# Patient Record
Sex: MALE | Race: WHITE | Employment: OTHER | URBAN - METROPOLITAN AREA
[De-identification: names, ages, dates, MRNs, and addresses within clinical notes are randomized per-mention and may not be internally consistent; named-entity substitution may affect disease eponyms.]

---

## 2023-01-04 ENCOUNTER — TELEPHONE (OUTPATIENT)
Dept: CARDIOLOGY CLINIC | Age: 83
End: 2023-01-04

## 2023-01-04 ENCOUNTER — OFFICE VISIT (OUTPATIENT)
Dept: CARDIOLOGY CLINIC | Age: 83
End: 2023-01-04

## 2023-01-04 VITALS
HEART RATE: 79 BPM | DIASTOLIC BLOOD PRESSURE: 72 MMHG | BODY MASS INDEX: 32.19 KG/M2 | SYSTOLIC BLOOD PRESSURE: 126 MMHG | WEIGHT: 212.4 LBS | HEIGHT: 68 IN | OXYGEN SATURATION: 96 %

## 2023-01-04 DIAGNOSIS — I48.92 ATRIAL FLUTTER, UNSPECIFIED TYPE (HCC): ICD-10-CM

## 2023-01-04 DIAGNOSIS — I49.9 IRREGULAR HEART RATE: Primary | ICD-10-CM

## 2023-01-04 NOTE — TELEPHONE ENCOUNTER
Patient was seen in office 1/4/2023. Atrial Flutter Ablation discussed and agreed upon by Ge Cabral and patient. Medications NOT discussed. Patient will NOT need covid testing prior. Thank you!         Meds to hold:  Xarelto 24 hours prior to procedure   Vitamin B Complex AM of procedure  Citrical+D AM of procedure  Multivitamin AM of procedure    Can take other medications AM of procedure with sip of water

## 2023-01-04 NOTE — PROGRESS NOTES
[facility]   Cardiac Consultation    Date: 1/4/23  Patient Name: Mya Archibald  YOB: 1940    Primary Care Physician: Cynthia Mejia    CHIEF COMPLAINT:   Chief Complaint   Patient presents with    3 Month Follow-Up    Other     Atrial flutter       HPI:  Mya Archibald is a 79 y.o. male  he had an audiology appointment in July and had complaints of dizziness. ECG at that time showed atrial flutter and he was started on Xarelto. Echocardiography from 8/11/2022 demonstrates LVEF of 45 to 50% with no significant valvular abnormalities. A cardiac event monitor worn for 24 hours in 08/2022 demonstrated predominately AF (87% burden) with an average HR of 83 BPM (710-125), PAC burden 4%. Prior to this, it had been 12 years since he had had an ECG. He reported that he recently had numbness in his left arm and leg, head and face included. He reported feeling occasional flutters. He reported that he stayed active with working on cars and working in his barn. He reported that he tolerates these activities well. He reported that he fatigues easily. Today, 01/04/2023, he reports feeling overall well. He reports continuing to feel fatigued on occasion with palpitations. Patient denies current edema, chest pain, sob, dizziness or syncope. Patient is taking all cardiac medications as prescribed and tolerates them well. Past Medical History:   has no past medical history on file. Surgical History:   has no past surgical history on file. Social History:   reports that he has never smoked. He has never used smokeless tobacco. He reports that he does not currently use alcohol. He reports that he does not use drugs. Family History:  family history is not on file.      Home Medications:  Outpatient Encounter Medications as of 1/4/2023   Medication Sig Dispense Refill    terazosin (HYTRIN) 10 MG capsule Take 10 mg by mouth daily      XARELTO 20 MG TABS tablet Take 20 mg by mouth daily      rosuvastatin (CRESTOR) 10 MG tablet Take 10 mg by mouth daily      fluticasone-salmeterol (ADVAIR) 100-50 MCG/ACT AEPB diskus inhaler Inhale 1 puff into the lungs every 12 hours      Multiple Vitamins-Minerals (THERAPEUTIC MULTIVITAMIN-MINERALS) tablet Take 1 tablet by mouth daily      calcium citrate-vitamin D (CITRICAL + D) 315-250 MG-UNIT TABS per tablet Take 1 tablet by mouth daily (with breakfast)      aspirin 81 MG chewable tablet Take 81 mg by mouth daily      B Complex Vitamins (VITAMIN B COMPLEX PO) Take by mouth daily       No facility-administered encounter medications on file as of 1/4/2023. Data:  ECG 1/4/23: Personally reviewed. All current cardiac medications reviewed and adjusted accordingly  1/4/23    Allergies:  Patient has no known allergies. Review of Systems   Constitutional: Negative. HENT: Negative. Eyes: Negative. Respiratory: Negative. Cardiovascular: Negative. Gastrointestinal: Negative. Genitourinary: Negative. Musculoskeletal: Negative. Skin: Negative. Neurological: Negative. Hematological: Negative. Psychiatric/Behavioral: Negative. /72   Pulse 79   Ht 5' 8\" (1.727 m)   Wt 212 lb 6.4 oz (96.3 kg)   SpO2 96%   BMI 32.30 kg/m²     DATA:  ECG 1/4/23: Personally reviewed. ECHO 83/28/4298 ([address] [place] [address]) :        STRESS 56/04/7181 ([place]) :       Objective:  Physical Exam   Constitutional: He is oriented to person, place, and time. He appears well-developed and well-nourished. HENT:   Head: Normocephalic and atraumatic. Eyes: Pupils are equal, round, and reactive to light. Neck: Normal range of motion. Cardiovascular: Normal rate, irregular rhythm and normal heart sounds. Pulmonary/Chest: Effort normal and breath sounds normal.   Abdominal: Soft. No tenderness. Musculoskeletal: Normal range of motion. He exhibits no edema. Neurological: He is alert and oriented to person, place, and time.    Skin: Skin is warm and dry. Psychiatric: He has a normal mood and affect. Assessment:  Atrial flutter-this was identified on an evaluation for dizziness. He has modest left ventricular dysfunction by echocardiography. ECG in the office 10/24/2022 demonstrated typical appearing tricuspid annular flutter with adequately controlled heart rate and right bundle branch block. He remains in atrial flutter per ECG in office today. Since he has sustained an typical appearing atrial flutter, catheter ablation is the most reasonable treatment option. This offers a greater than 90% chance of permanent cure with a 1 to 2% likelihood of a procedure related complication. Recommend proceeding with RFCA for AFL as discussed. RBBB- per ECG      Plan:  Discussed risks and benefits of RFCA for atrial flutter   -patient would like to proceed with this option(literature provided)  2. Continue taking cardiac medications as prescribed  3. Follow up after procedure     QUALITY MEASURES  1. Tobacco Cessation Counseling: NA  2. Retake of BP if >140/90:   Yes  3. Documentation to PCP/referring for new patient:  Sent to PCP at close of office visit  4. CAD patient on anti-platelet: NA  5. CAD patient on STATIN therapy:  Yes  6. Patient with CHF and aFib on anticoagulation: Yes      Óscar Main M.D.     Yuli Galicia RN, am scribing for and in the presence of Dr. Óscar Main. 01/04/23 10:58 AM   Debora Amor RN     I, Dr. Óscar Main, personally performed the services described in this documentation as scribed by Debora Amor RN  in my presence, and it is both accurate and complete.

## 2023-01-04 NOTE — PATIENT INSTRUCTIONS
Plan:  Discussed risks and benefits of RFCA for atrial flutter   -patient would like to proceed with this option(literature provided)  2. Continue taking cardiac medications as prescribed  3.  Follow up after procedure

## 2023-01-06 NOTE — TELEPHONE ENCOUNTER
Spoke with patient. Patient is scheduled with Dr. Gina Marcos for Aflutter Ablation with Carto on 22 at 12:30pm [facility], arrival time of 11am to the Cath Lab. Please have patient arrive to the main entrance of [facility] and check in with the registration desk. Remind patient to be NPO after midnight (8 hours prior). Do not apply lotions/creams on skin the day of procedure. Pt's  updated from [birth year].

## 2023-01-18 ENCOUNTER — HOSPITAL ENCOUNTER (INPATIENT)
Dept: CARDIAC CATH/INVASIVE PROCEDURES | Age: 83
LOS: 1 days | Discharge: HOME | DRG: 274 | End: 2023-01-19
Attending: INTERNAL MEDICINE | Admitting: INTERNAL MEDICINE
Payer: OTHER GOVERNMENT

## 2023-01-18 PROBLEM — Z98.890 STATUS POST ABLATION OF ATRIAL FLUTTER: Status: ACTIVE | Noted: 2023-01-18

## 2023-01-18 PROBLEM — Z86.79 STATUS POST ABLATION OF ATRIAL FLUTTER: Status: ACTIVE | Noted: 2023-01-18

## 2023-01-18 LAB
ANION GAP SERPL CALCULATED.3IONS-SCNC: 10 MMOL/L (ref 3–16)
BUN BLDV-MCNC: 17 MG/DL (ref 7–20)
CALCIUM SERPL-MCNC: 9.2 MG/DL (ref 8.3–10.6)
CHLORIDE BLD-SCNC: 105 MMOL/L (ref 99–110)
CHOLESTEROL, TOTAL: 118 MG/DL (ref 0–199)
CO2: 27 MMOL/L (ref 21–32)
CREAT SERPL-MCNC: 0.9 MG/DL (ref 0.8–1.3)
EKG ATRIAL RATE: 55 BPM
EKG ATRIAL RATE: 75 BPM
EKG DIAGNOSIS: NORMAL
EKG DIAGNOSIS: NORMAL
EKG P AXIS: 68 DEGREES
EKG P-R INTERVAL: 208 MS
EKG Q-T INTERVAL: 418 MS
EKG Q-T INTERVAL: 446 MS
EKG QRS DURATION: 134 MS
EKG QRS DURATION: 136 MS
EKG QTC CALCULATION (BAZETT): 426 MS
EKG QTC CALCULATION (BAZETT): 470 MS
EKG R AXIS: -12 DEGREES
EKG R AXIS: 25 DEGREES
EKG T AXIS: 19 DEGREES
EKG T AXIS: 28 DEGREES
EKG VENTRICULAR RATE: 55 BPM
EKG VENTRICULAR RATE: 76 BPM
GFR SERPL CREATININE-BSD FRML MDRD: >60 ML/MIN/{1.73_M2}
GLUCOSE BLD-MCNC: 100 MG/DL (ref 70–99)
HCT VFR BLD CALC: 44.6 % (ref 40.5–52.5)
HDLC SERPL-MCNC: 46 MG/DL (ref 40–60)
HEMOGLOBIN: 14.7 G/DL (ref 13.5–17.5)
INR BLD: 1.11 (ref 0.87–1.14)
LDL CHOLESTEROL CALCULATED: 49 MG/DL
MCH RBC QN AUTO: 29.3 PG (ref 26–34)
MCHC RBC AUTO-ENTMCNC: 32.9 G/DL (ref 31–36)
MCV RBC AUTO: 89.2 FL (ref 80–100)
PDW BLD-RTO: 13.2 % (ref 12.4–15.4)
PLATELET # BLD: 188 K/UL (ref 135–450)
PLATELET SLIDE REVIEW: ADEQUATE
PMV BLD AUTO: 9.2 FL (ref 5–10.5)
POTASSIUM REFLEX MAGNESIUM: 4 MMOL/L (ref 3.5–5.1)
PROTHROMBIN TIME: 14.2 SEC (ref 11.7–14.5)
RBC # BLD: 5 M/UL (ref 4.2–5.9)
SLIDE REVIEW: NORMAL
SODIUM BLD-SCNC: 142 MMOL/L (ref 136–145)
SPECIMEN STATUS: NORMAL
TRIGL SERPL-MCNC: 114 MG/DL (ref 0–150)
VLDLC SERPL CALC-MCNC: 23 MG/DL
WBC # BLD: 6.9 K/UL (ref 4–11)

## 2023-01-18 PROCEDURE — C1730 CATH, EP, 19 OR FEW ELECT: HCPCS

## 2023-01-18 PROCEDURE — 02K83ZZ MAP CONDUCTION MECHANISM, PERCUTANEOUS APPROACH: ICD-10-PCS | Performed by: INTERNAL MEDICINE

## 2023-01-18 PROCEDURE — 2500000003 HC RX 250 WO HCPCS

## 2023-01-18 PROCEDURE — 2709999900 HC NON-CHARGEABLE SUPPLY

## 2023-01-18 PROCEDURE — C1894 INTRO/SHEATH, NON-LASER: HCPCS

## 2023-01-18 PROCEDURE — 02583ZZ DESTRUCTION OF CONDUCTION MECHANISM, PERCUTANEOUS APPROACH: ICD-10-PCS | Performed by: INTERNAL MEDICINE

## 2023-01-18 PROCEDURE — 86850 RBC ANTIBODY SCREEN: CPT

## 2023-01-18 PROCEDURE — 6360000002 HC RX W HCPCS

## 2023-01-18 PROCEDURE — 93005 ELECTROCARDIOGRAM TRACING: CPT | Performed by: INTERNAL MEDICINE

## 2023-01-18 PROCEDURE — 2060000000 HC ICU INTERMEDIATE R&B

## 2023-01-18 PROCEDURE — 2580000003 HC RX 258: Performed by: INTERNAL MEDICINE

## 2023-01-18 PROCEDURE — 6370000000 HC RX 637 (ALT 250 FOR IP): Performed by: INTERNAL MEDICINE

## 2023-01-18 PROCEDURE — 93653 COMPRE EP EVAL TX SVT: CPT

## 2023-01-18 PROCEDURE — C1732 CATH, EP, DIAG/ABL, 3D/VECT: HCPCS

## 2023-01-18 PROCEDURE — 86901 BLOOD TYPING SEROLOGIC RH(D): CPT

## 2023-01-18 PROCEDURE — 85610 PROTHROMBIN TIME: CPT

## 2023-01-18 PROCEDURE — 85027 COMPLETE CBC AUTOMATED: CPT

## 2023-01-18 PROCEDURE — 80061 LIPID PANEL: CPT

## 2023-01-18 PROCEDURE — 93010 ELECTROCARDIOGRAM REPORT: CPT | Performed by: INTERNAL MEDICINE

## 2023-01-18 PROCEDURE — 4A023FZ MEASUREMENT OF CARDIAC RHYTHM, PERCUTANEOUS APPROACH: ICD-10-PCS | Performed by: INTERNAL MEDICINE

## 2023-01-18 PROCEDURE — 86900 BLOOD TYPING SEROLOGIC ABO: CPT

## 2023-01-18 PROCEDURE — 4A0234Z MEASUREMENT OF CARDIAC ELECTRICAL ACTIVITY, PERCUTANEOUS APPROACH: ICD-10-PCS | Performed by: INTERNAL MEDICINE

## 2023-01-18 PROCEDURE — 80048 BASIC METABOLIC PNL TOTAL CA: CPT

## 2023-01-18 RX ORDER — ACETAMINOPHEN 325 MG/1
650 TABLET ORAL EVERY 4 HOURS PRN
Status: DISCONTINUED | OUTPATIENT
Start: 2023-01-18 | End: 2023-01-19 | Stop reason: HOSPADM

## 2023-01-18 RX ORDER — FENTANYL CITRATE 50 UG/ML
INJECTION, SOLUTION INTRAMUSCULAR; INTRAVENOUS
Status: COMPLETED | OUTPATIENT
Start: 2023-01-18 | End: 2023-01-18

## 2023-01-18 RX ORDER — ONDANSETRON 2 MG/ML
4 INJECTION INTRAMUSCULAR; INTRAVENOUS EVERY 6 HOURS PRN
Status: DISCONTINUED | OUTPATIENT
Start: 2023-01-18 | End: 2023-01-19 | Stop reason: HOSPADM

## 2023-01-18 RX ORDER — M-VIT,TX,IRON,MINS/CALC/FOLIC 27MG-0.4MG
1 TABLET ORAL DAILY
Status: DISCONTINUED | OUTPATIENT
Start: 2023-01-18 | End: 2023-01-19 | Stop reason: HOSPADM

## 2023-01-18 RX ORDER — DOXAZOSIN 2 MG/1
4 TABLET ORAL DAILY
Status: DISCONTINUED | OUTPATIENT
Start: 2023-01-18 | End: 2023-01-19 | Stop reason: HOSPADM

## 2023-01-18 RX ORDER — SODIUM CHLORIDE 0.9 % (FLUSH) 0.9 %
5-40 SYRINGE (ML) INJECTION EVERY 12 HOURS SCHEDULED
Status: DISCONTINUED | OUTPATIENT
Start: 2023-01-18 | End: 2023-01-19 | Stop reason: HOSPADM

## 2023-01-18 RX ORDER — SODIUM CHLORIDE 0.9 % (FLUSH) 0.9 %
5-40 SYRINGE (ML) INJECTION PRN
Status: DISCONTINUED | OUTPATIENT
Start: 2023-01-18 | End: 2023-01-19 | Stop reason: HOSPADM

## 2023-01-18 RX ORDER — CALCIUM CARBONATE-CHOLECALCIFEROL TAB 250 MG-125 UNIT 250-125 MG-UNIT
1 TAB ORAL
Status: DISCONTINUED | OUTPATIENT
Start: 2023-01-19 | End: 2023-01-19 | Stop reason: HOSPADM

## 2023-01-18 RX ORDER — SODIUM CHLORIDE 9 MG/ML
INJECTION, SOLUTION INTRAVENOUS PRN
Status: DISCONTINUED | OUTPATIENT
Start: 2023-01-18 | End: 2023-01-19 | Stop reason: HOSPADM

## 2023-01-18 RX ORDER — ASPIRIN 81 MG/1
81 TABLET, CHEWABLE ORAL DAILY
Status: DISCONTINUED | OUTPATIENT
Start: 2023-01-18 | End: 2023-01-19 | Stop reason: HOSPADM

## 2023-01-18 RX ORDER — MIDAZOLAM HYDROCHLORIDE 1 MG/ML
INJECTION INTRAMUSCULAR; INTRAVENOUS
Status: COMPLETED | OUTPATIENT
Start: 2023-01-18 | End: 2023-01-18

## 2023-01-18 RX ORDER — ROSUVASTATIN CALCIUM 10 MG/1
10 TABLET, COATED ORAL DAILY
Status: DISCONTINUED | OUTPATIENT
Start: 2023-01-18 | End: 2023-01-19 | Stop reason: HOSPADM

## 2023-01-18 RX ORDER — LORAZEPAM 0.5 MG/1
0.5 TABLET ORAL
Status: ACTIVE | OUTPATIENT
Start: 2023-01-18 | End: 2023-01-19

## 2023-01-18 RX ORDER — ASPIRIN 325 MG
325 TABLET ORAL ONCE
Status: DISCONTINUED | OUTPATIENT
Start: 2023-01-18 | End: 2023-01-19 | Stop reason: HOSPADM

## 2023-01-18 RX ADMIN — FENTANYL CITRATE 50 MCG: 50 INJECTION, SOLUTION INTRAMUSCULAR; INTRAVENOUS at 12:41

## 2023-01-18 RX ADMIN — ASPIRIN 81 MG: 81 TABLET, CHEWABLE ORAL at 18:14

## 2023-01-18 RX ADMIN — ROSUVASTATIN CALCIUM 10 MG: 10 TABLET, FILM COATED ORAL at 18:11

## 2023-01-18 RX ADMIN — Medication 10 ML: at 19:36

## 2023-01-18 RX ADMIN — MIDAZOLAM HYDROCHLORIDE 1 MG: 1 INJECTION INTRAMUSCULAR; INTRAVENOUS at 14:20

## 2023-01-18 RX ADMIN — MIDAZOLAM HYDROCHLORIDE 1 MG: 1 INJECTION INTRAMUSCULAR; INTRAVENOUS at 12:41

## 2023-01-18 RX ADMIN — FENTANYL CITRATE 25 MCG: 50 INJECTION, SOLUTION INTRAMUSCULAR; INTRAVENOUS at 13:33

## 2023-01-18 RX ADMIN — Medication 1 TABLET: at 18:11

## 2023-01-18 RX ADMIN — FENTANYL CITRATE 50 MCG: 50 INJECTION, SOLUTION INTRAMUSCULAR; INTRAVENOUS at 14:21

## 2023-01-18 RX ADMIN — MIDAZOLAM HYDROCHLORIDE 0.5 MG: 1 INJECTION INTRAMUSCULAR; INTRAVENOUS at 13:33

## 2023-01-18 RX ADMIN — DOXAZOSIN 4 MG: 2 TABLET ORAL at 18:12

## 2023-01-18 ASSESSMENT — PAIN SCALES - GENERAL: PAINLEVEL_OUTOF10: 0

## 2023-01-18 ASSESSMENT — LIFESTYLE VARIABLES
HOW MANY STANDARD DRINKS CONTAINING ALCOHOL DO YOU HAVE ON A TYPICAL DAY: PATIENT DOES NOT DRINK
HOW OFTEN DO YOU HAVE A DRINK CONTAINING ALCOHOL: NEVER

## 2023-01-18 NOTE — SEDATION DOCUMENTATION
Sedation start time: 1239  Sedation stop time: 1424  Mallampati: 3  Pre and post Niki score: 10/10  Medication given: IV Versed 2.5 mg, IV fentanyl 125 mcg  Pre-Procedure Assessment / Plan:  ASA: Class 2 - A normal healthy patient with mild systemic disease  Level of Sedation Plan: Moderate sedation  Post Procedure plan: Return to same level of care     An independent trained observer pushed medications at my direction. We monitored the patient's level of consciousness and vital signs/physiologic status throughout the procedure duration (see start and start times above).

## 2023-01-18 NOTE — H&P
Patient Name: Kenny Guallpa  YOB: 1940     Primary Care Physician: Alee Mir     CHIEF COMPLAINT:        Chief Complaint   Patient presents with    3 Month Follow-Up    Other       Atrial flutter         HPI:  Kenny Guallpa is a 79 y.o. male  he had an audiology appointment in July and had complaints of dizziness. ECG at that time showed atrial flutter and he was started on Xarelto. Echocardiography from 8/11/2022 demonstrates LVEF of 45 to 50% with no significant valvular abnormalities. A cardiac event monitor worn for 24 hours in 08/2022 demonstrated predominately AF (87% burden) with an average HR of 83 BPM (710-125), PAC burden 4%. Prior to this, it had been 12 years since he had had an ECG. He reported that he recently had numbness in his left arm and leg, head and face included. He reported feeling occasional flutters. He reported that he stayed active with working on cars and working in his barn. He reported that he tolerates these activities well. He reported that he fatigues easily. Today, 01/04/2023, he reports feeling overall well. He reports continuing to feel fatigued on occasion with palpitations. Patient denies current edema, chest pain, sob, dizziness or syncope. Patient is taking all cardiac medications as prescribed and tolerates them well. Past Medical History:   has no past medical history on file. Surgical History:   has no past surgical history on file. Social History:   reports that he has never smoked. He has never used smokeless tobacco. He reports that he does not currently use alcohol. He reports that he does not use drugs. Family History:  family history is not on file.       Home Medications:  Encounter Medications          Outpatient Encounter Medications as of 1/4/2023   Medication Sig Dispense Refill    terazosin (HYTRIN) 10 MG capsule Take 10 mg by mouth daily        XARELTO 20 MG TABS tablet Take 20 mg by mouth daily rosuvastatin (CRESTOR) 10 MG tablet Take 10 mg by mouth daily        fluticasone-salmeterol (ADVAIR) 100-50 MCG/ACT AEPB diskus inhaler Inhale 1 puff into the lungs every 12 hours        Multiple Vitamins-Minerals (THERAPEUTIC MULTIVITAMIN-MINERALS) tablet Take 1 tablet by mouth daily        calcium citrate-vitamin D (CITRICAL + D) 315-250 MG-UNIT TABS per tablet Take 1 tablet by mouth daily (with breakfast)        aspirin 81 MG chewable tablet Take 81 mg by mouth daily        B Complex Vitamins (VITAMIN B COMPLEX PO) Take by mouth daily          No facility-administered encounter medications on file as of 1/4/2023. Data:  ECG 1/4/23: Personally reviewed. All current cardiac medications reviewed and adjusted accordingly  1/4/23     Allergies:  Patient has no known allergies. Review of Systems   Constitutional: Negative. HENT: Negative. Eyes: Negative. Respiratory: Negative. Cardiovascular: Negative. Gastrointestinal: Negative. Genitourinary: Negative. Musculoskeletal: Negative. Skin: Negative. Neurological: Negative. Hematological: Negative. Psychiatric/Behavioral: Negative. /72   Pulse 79   Ht 5' 8\" (1.727 m)   Wt 212 lb 6.4 oz (96.3 kg)   SpO2 96%   BMI 32.30 kg/m²      DATA:  ECG 1/4/23: Personally reviewed. ECHO 99/26/1045 ([address] [place] [address]) :        STRESS 91/73/1450 ([place]) :      Objective:  Physical Exam   Constitutional: He is oriented to person, place, and time. He appears well-developed and well-nourished. HENT:   Head: Normocephalic and atraumatic. Eyes: Pupils are equal, round, and reactive to light. Neck: Normal range of motion. Cardiovascular: Normal rate, irregular rhythm and normal heart sounds. Pulmonary/Chest: Effort normal and breath sounds normal.   Abdominal: Soft. No tenderness. Musculoskeletal: Normal range of motion. He exhibits no edema. Neurological: He is alert and oriented to person, place, and time. Skin: Skin is warm and dry. Psychiatric: He has a normal mood and affect. Assessment:  Atrial flutter-this was identified on an evaluation for dizziness. He has modest left ventricular dysfunction by echocardiography. ECG in the office 10/24/2022 demonstrated typical appearing tricuspid annular flutter with adequately controlled heart rate and right bundle branch block. He remains in atrial flutter per ECG in office today. Since he has sustained an typical appearing atrial flutter, catheter ablation is the most reasonable treatment option. This offers a greater than 90% chance of permanent cure with a 1 to 2% likelihood of a procedure related complication. Recommend proceeding with RFCA for AFL as discussed. RBBB- per ECG        Plan:  Discussed risks and benefits of RFCA for atrial flutter              -patient would like to proceed with this option(literature provided)  2. Continue taking cardiac medications as prescribed  3.  Follow up after procedure            Gabe Marcus M.D.

## 2023-01-18 NOTE — PROCEDURES
[facility]                            CARDIAC CATHETERIZATION    PATIENT NAME: Monty De León                    :        1940  MED REC NO:   [id number]                          ROOM:  ACCOUNT NO:   [de-identified]                           ADMIT DATE: 2023  PROVIDER:     Philip Rizvi MD    DATE OF PROCEDURE:  2023    CARDIAC ELECTROPHYSIOLOGY PROCEDURE NOTE    SURGEON:  Philip Rizvi MD    PROCEDURES:  1. Catheter ablation of PSVT  2. Three-dimensional electrode anatomic intracardiac mapping. 3.  Electrophysiology study postablation. INDICATIONS:  Typical atrial flutter. FINDINGS:  1. Typical appearing atrial flutter at baseline. 2.  Entrainment mapping demonstrates participation of cavotricuspid  isthmus. 3.  Postablation testing demonstrates no inducible arrhythmia. RESULTS OF RADIOFREQUENCY CATHETER ABLATION:  1. Successful ablation of cavotricuspid isthmus. 2.  Complete cavotricuspid isthmus block 45 minutes postablation. PROCEDURE DESCRIPTION:  After informed consent was obtained, the patient  was brought to the cardiac electrophysiology laboratory in a fasting  state on 2023. He was prepared for the procedure by application  of ECG electrodes and an automated blood pressure cuff. Pacing and  defibrillation patches were applied to the chest in the  anterior-posterior orientation. The right and left groins were shaved  and prepared with an antibacterial soap and the patient was draped in a  sterile fashion. He received IV midazolam and local anesthetic into the  skin above the right and left femoral veins. After adequate anesthesia  was achieved, under ultrasound guidance the right and left femoral veins  were cannulated with a thin-walled 18-gauge needle, through which a  J-tipped guidewire was passed.   Three guidewires were placed in the left  femoral vein and two guidewires were placed in the right femoral vein. Three 7-Tajik introducers were advanced over the guidewires in the left  femoral vein. Two 8-Tajik introducers were advanced over the  guidewires in the right femoral vein. The guidewires and the dilators  were removed. The introducers were aspirated and flushed carefully and  placed on a continuous infusion of heparinized saline. Two standard  quadripolar electrode catheters were introduced into the left femoral  vein and advanced into the heart under fluoroscopic guidance. These  catheters were placed in right ventricular apex, across the tricuspid  valve to record a His bundle potential.  A steerable decapolar electrode  catheter was introduced into the left femoral vein and advanced into the  heart under fluoroscopic guidance. This was positioned along the  posterolateral aspect of the tricuspid valve annulus. A steerable  quadripolar electrode catheter was introduced in to the right femoral  vein and advanced to the heart under fluoroscopic guidance. This  catheter was placed in the proximal coronary sinus. A steerable  quadripolar electrode catheter with irrigated tip technology was  introduced into the right femoral vein and advanced to the heart under  fluoroscopic guidance for intracardiac mapping. After the catheters  were determined to be in stable position, programmed stimulation was  performed. The programmed stimulation consisted of overdrive atrial  pacing for entrainment mapping. This was performed from the  cavotricuspid isthmus, from the coronary sinus, and from the high right  atrium, the activation sequence demonstrated counterclockwise rotation  around the tricuspid valve annulus. Entrainment mapping demonstrated  participation of the cavotricuspid isthmus. Typical atrial flutter was  identified at that time and catheter ablation was performed.     The ablation catheter was advanced to the tricuspid valve annulus in a  true posterior orientation. A series of radiofrequency energy lesions  were then applied from the tricuspid valve annulus back towards the IVC. The tachycardia terminated as the IVC was encountered, pacing from the  coronary sinus os, however demonstrated some residual antidromic distal  conduction. The catheter was then used to examine the previous ablation  line and a site was identified close to the IVC, which had low amplitude  and high frequency early signal.  A radiofrequency energy lesion was  applied at the site, which resulted in the prompt interruption of  cavotricuspid isthmus conduction. Pacing from the posterolateral  tricuspid valve annulus also demonstrated orthodromic cavotricuspid  isthmus block. Program stimulation was then performed, which included  incremental atrial and ventricular pacing and atrial extrastimulus  testing. No arrhythmias were identified at that time. Pacing from the  posterolateral tricuspid valve annulus as well as the coronary sinus os  was continued for the next 45 minutes, which demonstrated complete  bidirectional cavotricuspid isthmus block. After 45 minutes the  procedure was considered complete. The catheters were removed. Venous  introducers were removed and firm manual pressure was applied over the  venous puncture sites until adequate hemostasis was achieved. The  patient tolerated the procedure well. There were no apparent  complications. Estimated blood loss less then 20 mL. RESULTS:  At baseline in atrial flutter the QRS duration was 134  milliseconds. QT interval was 346 milliseconds. AH interval 65  milliseconds. HV interval 42 milliseconds. The cavotricuspid isthmus  conduction time during counter clockwise tricuspid annulus flutter was  103 milliseconds. Postablation EPS rhythm is sinus. The RI interval was 232 milliseconds. QRS duration 136 milliseconds. The QT interval 358 milliseconds.   AH  interval 110 milliseconds. HV interval 60 milliseconds. The AV block  cycle length was 590 milliseconds. The AV node effective refractory  period at a paced cycle length of 600 milliseconds is 400 milliseconds. The VA block cycle length is greater than 700 milliseconds.         Johnnie Vilchis MD    D: 01/18/2023 14:22:56       T: 01/18/2023 15:20:24     [email or web address]  Job#: [id number]     Doc#: [id number]    CC:

## 2023-01-19 VITALS
SYSTOLIC BLOOD PRESSURE: 120 MMHG | OXYGEN SATURATION: 93 % | HEIGHT: 68 IN | BODY MASS INDEX: 32.44 KG/M2 | TEMPERATURE: 99.1 F | RESPIRATION RATE: 16 BRPM | WEIGHT: 214.07 LBS | HEART RATE: 66 BPM | DIASTOLIC BLOOD PRESSURE: 67 MMHG

## 2023-01-19 LAB
ABO/RH: NORMAL
ANTIBODY SCREEN: NORMAL
EKG ATRIAL RATE: 65 BPM
EKG DIAGNOSIS: NORMAL
EKG P AXIS: 9 DEGREES
EKG P-R INTERVAL: 186 MS
EKG Q-T INTERVAL: 438 MS
EKG QRS DURATION: 142 MS
EKG QTC CALCULATION (BAZETT): 455 MS
EKG R AXIS: 2 DEGREES
EKG T AXIS: -2 DEGREES
EKG VENTRICULAR RATE: 65 BPM

## 2023-01-19 PROCEDURE — 99239 HOSP IP/OBS DSCHRG MGMT >30: CPT

## 2023-01-19 PROCEDURE — 93005 ELECTROCARDIOGRAM TRACING: CPT

## 2023-01-19 PROCEDURE — 94640 AIRWAY INHALATION TREATMENT: CPT

## 2023-01-19 PROCEDURE — 6370000000 HC RX 637 (ALT 250 FOR IP): Performed by: INTERNAL MEDICINE

## 2023-01-19 RX ADMIN — CALCIUM CARBONATE-CHOLECALCIFEROL TAB 250 MG-125 UNIT 1 TABLET: 250-125 TAB at 08:41

## 2023-01-19 RX ADMIN — Medication 2 PUFF: at 08:04

## 2023-01-19 RX ADMIN — DOXAZOSIN 4 MG: 2 TABLET ORAL at 08:41

## 2023-01-19 RX ADMIN — Medication 1 TABLET: at 08:40

## 2023-01-19 RX ADMIN — RIVAROXABAN 20 MG: 20 TABLET, FILM COATED ORAL at 08:41

## 2023-01-19 RX ADMIN — ROSUVASTATIN CALCIUM 10 MG: 10 TABLET, FILM COATED ORAL at 08:40

## 2023-01-19 RX ADMIN — ASPIRIN 81 MG: 81 TABLET, CHEWABLE ORAL at 08:40

## 2023-01-19 NOTE — CARE COORDINATION
CASE MANAGEMENT INITIAL ASSESSMENT      Reviewed chart and completed assessment with patient:  Family present: none  Explained Case Management role/services. Patient  Primary contact information:see below    Health Care Decision Maker :   Primary Decision Maker: Sandra Arredondo - [phone]    Primary Decision Maker: Magda Rees - [phone]    Secondary Decision Maker: Pati Gray - Brother/Sister - [phone]          Can this person be reached and be able to respond quickly, such as within a few minutes or hours? Yes      Admit date/status:inpatient  1/18/2023  Diagnosis:Unspecified atrial flutter   Is this a Readmission?:  No    Readmission Screening completed?: No     Insurance:[place] [employer]   Precert required for SNF: Yes       3 night stay required: No    Living arrangements, Adls, care needs, prior to admission:Lives alone IPTA, still drives     Durable Medical Equipment at home:  Walker_x_Cane__xRTS__ BSC__Shower Chair__  02__ HHN__ CPAP__  BiPap__  Hospital Bed__ W/C___ Other_____    Services in the home and/or outpatient, prior to admission:None    Current PCP:ROJELIO Myers                                Medications:has coverage Prescription coverage? Yes Will pt require financial assistance with medications No     Transportation needs: None [facility] [place] will [place] him up         PT/OT recs:Not seen ambulatory    Hospital Exemption Notification (HEN):na    Barriers to discharge:none    Plan/comments: To return home without services. IPTA. His primary concern is getting a copy of his records to take to his [address] [place] [address] at his next appointment after discharge. He will have a ride home. S/P RFCA with EP.    ECOC on chart for MD signature

## 2023-01-19 NOTE — DISCHARGE SUMMARY
Patient ID:  Abby Driver  [id number]  60 y.o.  1940    Admit date: 1/18/2023    Discharge date and time: 1/19/2023    Admitting Physician: Cassi Shaikh MD     Discharge NP: Sharmila Verdin APRN-CNP    Admission Diagnoses: Unspecified atrial flutter [I48.92]  Status post ablation of atrial flutter [Z98.890, Z86.79]    Discharge Diagnoses: SAME    Admission Condition: fair    Discharged Condition: good    Hospital Course: Abby Driver was admitted on 1/19/2023 and had an RFCA of typical atrial flutter. Rhythm has been SR with HR in the 60's. He has no complaints today. He denies chest pain, palpitations, shortness of breath, and dizziness. He has eaten, ambulated, and voided.      Consults: none    Assessment:   Typical atrial flutter: stable   -s/p RFCA of cavotricuspid isthmus 1/18/2023  RBBB: stable     Plan:   Continue Xarelto    No changes with other current medications  Post procedure instructions reviewed  Follow up in office on 3/09/2023    Discharge Exam:  /67   Pulse 66   Temp 99.1 °F (37.3 °C) (Oral)   Resp 16   Ht 5' 8\" (1.727 m)   Wt 214 lb 1.1 oz (97.1 kg)   SpO2 93%   BMI 32.55 kg/m²     General Appearance:    Alert, cooperative, no distress, appears stated age   Head:    Normocephalic, without obvious abnormality, atraumatic   Eyes:    PERRL, conjunctiva/corneas clear, EOM's intact        Ears:    deferred   Nose:   Nares normal, septum midline, mucosa normal, no drainage    or sinus tenderness   Throat:   Lips, mucosa, and tongue normal; teeth and gums normal   Neck:   Supple, symmetrical, trachea midline, no adenopathy;        thyroid:  No enlargement/tenderness/nodules; no carotid    bruit or JVD   Back:     Symmetric, no curvature, ROM normal, no CVA tenderness   Lungs:     Clear to auscultation bilaterally, respirations unlabored   Chest wall:    No tenderness or deformity   Heart:    Regular rate and rhythm, S1 and S2 normal, no murmur, rub   or gallop; NSR on tele Abdomen:     Soft, non-tender, bowel sounds active all four quadrants,     no masses, no organomegaly   Genitalia:    deferred   Rectal:    deferred    Extremities:   Extremities normal, atraumatic, no cyanosis or edema; bilateral femoral sites stable (no sutures present)   Pulses:   2+ and symmetric all extremities   Skin:   Skin color, texture, turgor normal, no rashes or lesions   Lymph nodes:   Cervical, supraclavicular, and axillary nodes normal   Neurologic:   CNII-XII intact.  Normal strength, sensation and reflexes       throughout     Disposition: home    Patient Instructions:      Medication List        CONTINUE taking these medications      aspirin 81 MG chewable tablet     calcium citrate-vitamin D 315-250 MG-UNIT Tabs per tablet  Commonly known as: CITRICAL + D     fluticasone-salmeterol 100-50 MCG/ACT Aepb diskus inhaler  Commonly known as: ADVAIR     rosuvastatin 10 MG tablet  Commonly known as: CRESTOR     terazosin 10 MG capsule  Commonly known as: HYTRIN     therapeutic multivitamin-minerals tablet     VITAMIN B COMPLEX PO     Xarelto 20 MG Tabs tablet  Generic drug: rivaroxaban            Post procedure instructions given  Activity: as tolerated  Diet: cardiac diet    BROOKLYN Renner-CNP  [facility]  [phone]     Time spent on discharge of patient: >35 minutes, including plan of care, patient education, and care coordination

## 2023-01-19 NOTE — PLAN OF CARE
Problem: ABCDS Injury Assessment  Goal: Absence of physical injury  Outcome: Progressing     Problem: Pain  Goal: Verbalizes/displays adequate comfort level or baseline comfort level  Outcome: Progressing     Problem: Safety - Adult  Goal: Free from fall injury  Outcome: Progressing  All standard fall precautions in place.

## 2023-01-19 NOTE — DISCHARGE INSTRUCTIONS
FOLLOW-UP APPOINTMENTS    [facility] OFFICE - Appointment on March 9th at 11:30am with Denae Thao CNP , [address]. [facility],  [facility], [address] [place] [address] [address], [address] [facility], [address] [place] [address]. Office #: [phone]. If you are unable to make this appointment, please call to reschedule. Directions to [place] [address] towards [place]. [address] [facility] exit. Right off exit. Cross over [employer]. Right on [address] Left into hospital. Follow the signs to the emergency room ( turn left toward the Emergency room). Go right at the first stop sign. Just past the Emergency room at the second stop sign turn right and go up the ramp and park on the top level if possible. Go in the glass doors of the [facility] we on the top level of the garage [address]. As soon as you get in the door turn left and our office is the one with the glass doors.

## 2023-01-19 NOTE — PROGRESS NOTES
PIV x2 removed. Tip intact. Dressing in place. Tele box removed. [facility] notified of pt discharge. Discharge paperwork went over with and given to pt. Verbalizes understanding. Pt escorted to private vehicle arranged with the [address] [facility] Pt discharge home in stable condition with all belongings.

## 2023-03-09 ENCOUNTER — OFFICE VISIT (OUTPATIENT)
Dept: CARDIOLOGY CLINIC | Age: 83
End: 2023-03-09
Payer: OTHER GOVERNMENT

## 2023-03-09 VITALS
OXYGEN SATURATION: 96 % | DIASTOLIC BLOOD PRESSURE: 58 MMHG | WEIGHT: 210 LBS | HEART RATE: 58 BPM | SYSTOLIC BLOOD PRESSURE: 132 MMHG | BODY MASS INDEX: 31.83 KG/M2 | HEIGHT: 68 IN

## 2023-03-09 DIAGNOSIS — I48.92 ATRIAL FLUTTER, UNSPECIFIED TYPE (HCC): Primary | ICD-10-CM

## 2023-03-09 DIAGNOSIS — Z86.79 STATUS POST ABLATION OF ATRIAL FLUTTER: ICD-10-CM

## 2023-03-09 DIAGNOSIS — Z98.890 STATUS POST ABLATION OF ATRIAL FLUTTER: ICD-10-CM

## 2023-03-09 PROCEDURE — 93000 ELECTROCARDIOGRAM COMPLETE: CPT

## 2023-03-09 PROCEDURE — 99214 OFFICE O/P EST MOD 30 MIN: CPT

## 2023-03-09 PROCEDURE — 1124F ACP DISCUSS-NO DSCNMKR DOCD: CPT

## 2023-03-09 NOTE — PROGRESS NOTES
[address]   Electrophysiology Outpatient Note              Date:  March 9, 2023  Patient name: Joseph Zavaleta  YOB: 1940    Primary Care physician: Cedric ERICKSON    HISTORY OF PRESENT ILLNESS: The patient is a 79 y.o.  male with a history of typical atrial flutter and RBBB. In 7/2022 EKG showed atrial flutter and he was started on Xarelto. In 8/2022 echo showed EF of 45-50%. Cardiac monitor showed predominately AF/AFL (87% burden) with average HR of 83 bpm. On 1/19/2023 he had an RFCA of typical atrial flutter. Today he is being seen for atrial flutter. EKG shows SB with RBBB with a HR of 55 bpm. He has been feeling well since his ablation. He is recovering from a cold. He has been doing exercises for his knee. He is riding the stationary bike and and lifting weights. He has an acre yard he takes care of. He has occasional brief palpitations. He denies chest pain, dizziness and weakness. He denies bleeding issues on Xarelto. He had a syncope episode 2 years ago while walking outside in the heat. His PCP is through the [place] in [address] [facility]. His HR is in the 60's-70's at home. Past Medical History:   has no past medical history on file. Past Surgical History:   has no past surgical history on file. Home Medications:    Prior to Admission medications    Medication Sig Start Date End Date Taking?  Authorizing Provider   terazosin (HYTRIN) 10 MG capsule Take 10 mg by mouth daily   Yes Historical Provider, MD   XARELTO 20 MG TABS tablet Take 20 mg by mouth daily 10/17/22  Yes Historical Provider, MD   rosuvastatin (CRESTOR) 10 MG tablet Take 10 mg by mouth daily   Yes Historical Provider, MD   fluticasone-salmeterol (ADVAIR) 100-50 MCG/ACT AEPB diskus inhaler Inhale 1 puff into the lungs every 12 hours   Yes Historical Provider, MD   Multiple Vitamins-Minerals (THERAPEUTIC MULTIVITAMIN-MINERALS) tablet Take 1 tablet by mouth daily   Yes Historical Provider, MD   calcium citrate-vitamin D (CITRICAL + D) 315-250 MG-UNIT TABS per tablet Take 1 tablet by mouth daily (with breakfast)   Yes Historical Provider, MD   aspirin 81 MG chewable tablet Take 81 mg by mouth daily   Yes Historical Provider, MD   B Complex Vitamins (VITAMIN B COMPLEX PO) Take by mouth daily   Yes Historical Provider, MD     Allergies:  Patient has no known allergies. Social History:   reports that he quit smoking about 20 years ago. His smoking use included cigarettes. He has never used smokeless tobacco. He reports that he does not currently use alcohol. He reports that he does not use drugs. Family History: family history is not on file. All 14 point review of systems are completed and pertinent positives are mentioned in the history of present illness. Other systems are reviewed and are negative. PHYSICAL EXAM:    Vital signs:    BP (!) 132/58   Pulse 58   Ht 5' 8\" (1.727 m)   Wt 210 lb (95.3 kg)   SpO2 96%   BMI 31.93 kg/m²      Constitutional and general appearance: alert, cooperative, no distress, and appears stated age  [de-identified]: Normal oral mucosa  Respiratory:  Normal excursion and expansion without use of accessory muscles  Resp auscultation: Normal breath sounds without wheezing, rhonchi, and rales  Cardiovascular:  Heart tones are crisp and normal. regular S1 and S2.  Jugular venous pulsation Normal  Peripheral pulses are symmetrical and full   Abdomen:  No masses or tenderness  Bowel sounds present  Extremities:   No cyanosis or clubbing   No lower extremity edema   Skin: warm and dry  Neurological:  Alert and oriented  Moves all extremities well  No abnormalities of mood, affect, memory, mentation, or behavior are noted    DATA:    ECG 3/09/2023:  SR with RBBB, HR of 55 bpm    ECHO 85/11/2398 ([place]) :        STRESS 58/14/1651 ([place]) : All labs and testing reviewed. CARDIOLOGY LABS:   CBC: No results for input(s): WBC, HGB, HCT, PLT in the last 72 hours.   BMP: No results for input(s): NA, K, CO2, BUN, CREATININE, LABGLOM, GLUCOSE in the last 72 hours. PT/INR: No results for input(s): PROTIME, INR in the last 72 hours. APTT:No results for input(s): APTT in the last 72 hours. FASTING LIPID PANEL:  Lab Results   Component Value Date/Time    HDL 46 01/18/2023 10:15 AM    LDLCALC 49 01/18/2023 10:15 AM    TRIG 114 01/18/2023 10:15 AM     LIVER PROFILE:No results for input(s): AST, ALT, ALB in the last 72 hours. IMPRESSION:    Patient Active Problem List   Diagnosis    Atrial flutter (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 Continue Xarelto (CrCl 84 mL/min)  2. Annual CBC and BMP due 1/2024  3. Continue to monitor heart rates at home  4.  Follow up in 4 months or sooner if needed     AYDEE Young, APRN-CNP  [employer]  [phone]

## 2023-03-09 NOTE — PATIENT INSTRUCTIONS
No changes today, continue your current medications     Continue to monitor for fatigue, dizziness and palpitations     Follow up in 4 months or sooner if needed

## 2023-07-11 ENCOUNTER — TELEPHONE (OUTPATIENT)
Dept: CARDIOLOGY CLINIC | Age: 83
End: 2023-07-11

## 2023-07-11 NOTE — TELEPHONE ENCOUNTER
Pt's VA authorization  2023. Va needs a request for services form stating \"continuation of care\". Please fax to [phone] along with last office note. Pt has appt tomorrow that will not be covered by VA if they don't get forms.

## 2023-07-12 ENCOUNTER — OFFICE VISIT (OUTPATIENT)
Dept: CARDIOLOGY CLINIC | Age: 83
End: 2023-07-12
Payer: OTHER GOVERNMENT

## 2023-07-12 ENCOUNTER — TELEPHONE (OUTPATIENT)
Dept: CARDIOLOGY CLINIC | Age: 83
End: 2023-07-12

## 2023-07-12 VITALS
HEIGHT: 68 IN | HEART RATE: 55 BPM | DIASTOLIC BLOOD PRESSURE: 60 MMHG | SYSTOLIC BLOOD PRESSURE: 126 MMHG | WEIGHT: 211 LBS | OXYGEN SATURATION: 94 % | BODY MASS INDEX: 31.98 KG/M2

## 2023-07-12 DIAGNOSIS — Z98.890 STATUS POST ABLATION OF ATRIAL FLUTTER: ICD-10-CM

## 2023-07-12 DIAGNOSIS — I42.8 OTHER CARDIOMYOPATHY (HCC): ICD-10-CM

## 2023-07-12 DIAGNOSIS — I45.10 RBBB: ICD-10-CM

## 2023-07-12 DIAGNOSIS — I48.92 ATRIAL FLUTTER, UNSPECIFIED TYPE (HCC): Primary | ICD-10-CM

## 2023-07-12 DIAGNOSIS — Z86.79 STATUS POST ABLATION OF ATRIAL FLUTTER: ICD-10-CM

## 2023-07-12 PROCEDURE — 1124F ACP DISCUSS-NO DSCNMKR DOCD: CPT | Performed by: NURSE PRACTITIONER

## 2023-07-12 PROCEDURE — 99214 OFFICE O/P EST MOD 30 MIN: CPT | Performed by: NURSE PRACTITIONER

## 2023-07-12 PROCEDURE — 93000 ELECTROCARDIOGRAM COMPLETE: CPT | Performed by: NURSE PRACTITIONER

## 2023-07-12 RX ORDER — KETOTIFEN FUMARATE 0.35 MG/ML
SOLUTION/ DROPS OPHTHALMIC
COMMUNITY
Start: 2023-04-11

## 2023-07-12 NOTE — PATIENT INSTRUCTIONS
Continue same medications    Labs: CBC, CMP and magnesium (Nonfasting labs)    Check echocardiogram:  -can call to schedule if done at [facility]  Call [address] ([phone]) to schedule

## 2023-07-12 NOTE — TELEPHONE ENCOUNTER
Ivon Montero spoke to Juany from the [place] for forms to be faxed. Forms received and completed. Faxed and scanned into media. Attempted to call VA to speak to Juany regarding receiving of fax.  OV for return call for confirmation

## 2023-07-13 NOTE — TELEPHONE ENCOUNTER
Spoke with pt and he stated that he is approved through the [place] for 180 day. His authorization ends on 01/08/2023. Pt is due for an appt with ep in early January. A note has been made in pts chart regarding authorization prior to appts.

## 2023-09-08 ENCOUNTER — HOSPITAL ENCOUNTER (OUTPATIENT)
Age: 83
Discharge: HOME | End: 2023-09-08
Payer: OTHER GOVERNMENT

## 2023-09-08 ENCOUNTER — HOSPITAL ENCOUNTER (OUTPATIENT)
Dept: CARDIOLOGY | Age: 83
Discharge: HOME | End: 2023-09-08
Payer: OTHER GOVERNMENT

## 2023-09-08 DIAGNOSIS — I42.8 OTHER CARDIOMYOPATHY (HCC): ICD-10-CM

## 2023-09-08 DIAGNOSIS — I48.92 ATRIAL FLUTTER, UNSPECIFIED TYPE (HCC): ICD-10-CM

## 2023-09-08 LAB
ALBUMIN SERPL-MCNC: 4 G/DL (ref 3.4–5)
ALBUMIN/GLOB SERPL: 1.4 {RATIO} (ref 1.1–2.2)
ALP SERPL-CCNC: 67 U/L (ref 40–129)
ALT SERPL-CCNC: 19 U/L (ref 10–40)
ANION GAP SERPL CALCULATED.3IONS-SCNC: 8 MMOL/L (ref 3–16)
AST SERPL-CCNC: 20 U/L (ref 15–37)
BILIRUB SERPL-MCNC: 0.5 MG/DL (ref 0–1)
BUN SERPL-MCNC: 15 MG/DL (ref 7–20)
CALCIUM SERPL-MCNC: 9.5 MG/DL (ref 8.3–10.6)
CHLORIDE SERPL-SCNC: 103 MMOL/L (ref 99–110)
CO2 SERPL-SCNC: 28 MMOL/L (ref 21–32)
CREAT SERPL-MCNC: 0.8 MG/DL (ref 0.8–1.3)
DEPRECATED RDW RBC AUTO: 13.3 % (ref 12.4–15.4)
GFR SERPLBLD CREATININE-BSD FMLA CKD-EPI: >60 ML/MIN/{1.73_M2}
GLUCOSE SERPL-MCNC: 90 MG/DL (ref 70–99)
HCT VFR BLD AUTO: 41.9 % (ref 40.5–52.5)
HGB BLD-MCNC: 14 G/DL (ref 13.5–17.5)
MAGNESIUM SERPL-MCNC: 2.4 MG/DL (ref 1.8–2.4)
MCH RBC QN AUTO: 29.3 PG (ref 26–34)
MCHC RBC AUTO-ENTMCNC: 33.3 G/DL (ref 31–36)
MCV RBC AUTO: 87.9 FL (ref 80–100)
PLATELET # BLD AUTO: 228 K/UL (ref 135–450)
PMV BLD AUTO: 10.2 FL (ref 5–10.5)
POTASSIUM SERPL-SCNC: 4.4 MMOL/L (ref 3.5–5.1)
PROT SERPL-MCNC: 6.9 G/DL (ref 6.4–8.2)
RBC # BLD AUTO: 4.76 M/UL (ref 4.2–5.9)
SODIUM SERPL-SCNC: 139 MMOL/L (ref 136–145)
WBC # BLD AUTO: 9.8 K/UL (ref 4–11)

## 2023-09-08 PROCEDURE — 36415 COLL VENOUS BLD VENIPUNCTURE: CPT

## 2023-09-08 PROCEDURE — 83735 ASSAY OF MAGNESIUM: CPT

## 2023-09-08 PROCEDURE — 93306 TTE W/DOPPLER COMPLETE: CPT

## 2023-09-08 PROCEDURE — 85027 COMPLETE CBC AUTOMATED: CPT

## 2023-09-08 PROCEDURE — 80053 COMPREHEN METABOLIC PANEL: CPT

## 2023-09-11 ENCOUNTER — TELEPHONE (OUTPATIENT)
Dept: CARDIOLOGY CLINIC | Age: 83
End: 2023-09-11

## 2023-09-11 NOTE — TELEPHONE ENCOUNTER
----- Message from BROOKLYN Kramer CNP sent at 9/8/2023  4:49 PM EDT -----  Echo shows improved and normalized LVEF (55%). Mild mitral and tricuspid insufficiency (mild leak thru valves). Overall this is okay. Continue same medications. Please call. Thanks!

## 2023-09-11 NOTE — TELEPHONE ENCOUNTER
----- Message from BROOKLYN Garcia CNP sent at 9/8/2023  4:48 PM EDT -----  Labs reviewed and within normal limits. Continue same meds. Please call. Thanks!

## 2023-12-13 NOTE — PROGRESS NOTES
[address] [facility]   Electrophysiology Outpatient Note              Date:  December 13, 2023  Patient name: Irvin Lang  YOB: 1940    Primary Care physician: Cornell Hodge MD    HISTORY OF PRESENT ILLNESS: The patient is a 79 y.o.  male with a history of typical atrial flutter, RBBB and cardiomyopathy              In 7/2022 EKG showed atrial flutter and he was started on Xarelto. In 8/2022 echo showed EF of 45-50%. Cardiac monitor showed predominately AF/AFL (87% burden) with average HR of 83 bpm. On 1/19/2023 he had RFCA typical atrial flutter. On 3/9/2023 Patient was seen in office. ECG showed sinus bradycardia with RBBB heart rate of 55. States he has been feeling well since his ablation. Patient was seen in July by general cardiology nurse practitioner and echo ordered to reevaluate LVEF.  9/8/2023 LVEF 55%    Today he is being seen for atrial flutter. EKG shows SB with a HR of 56. Patient denies chest pain, shortness of breath and palpitations. Patient states he feels well overall. He recently joined [facility] and has been trying to exercise more regularly. He also states he is going to try to lose some weight. Past Medical History:   has a past medical history of Atrial flutter ([address]), Cardiomyopathy ([address]), and RBBB. Past Surgical History:   has no past surgical history on file. Home Medications:    Prior to Admission medications    Medication Sig Start Date End Date Taking?  Authorizing Provider   ketotifen (ZADITOR) 0.025 % ophthalmic solution INSTILL 1 DROP INTO BOTH EYES TWICE A DAY FOR ALLERGIC CONJUNCTIVITIS 4/11/23   Myron Stewart MD   terazosin (HYTRIN) 10 MG capsule Take 1 capsule by mouth daily    Myron Stewart MD   XARELTO 20 MG TABS tablet Take 1 tablet by mouth daily 10/17/22   Myron Stewart MD   rosuvastatin (CRESTOR) 10 MG tablet Take 1 tablet by mouth daily    Myron Stewart MD   fluticasone-salmeterol (ADVAIR)

## 2023-12-14 ENCOUNTER — OFFICE VISIT (OUTPATIENT)
Dept: CARDIOLOGY CLINIC | Age: 83
End: 2023-12-14
Payer: OTHER GOVERNMENT

## 2023-12-14 VITALS
HEART RATE: 55 BPM | OXYGEN SATURATION: 98 % | DIASTOLIC BLOOD PRESSURE: 62 MMHG | WEIGHT: 221 LBS | SYSTOLIC BLOOD PRESSURE: 122 MMHG | BODY MASS INDEX: 33.49 KG/M2 | HEIGHT: 68 IN

## 2023-12-14 DIAGNOSIS — I48.3 TYPICAL ATRIAL FLUTTER (HCC): Primary | ICD-10-CM

## 2023-12-14 PROCEDURE — 99214 OFFICE O/P EST MOD 30 MIN: CPT

## 2023-12-14 PROCEDURE — 1124F ACP DISCUSS-NO DSCNMKR DOCD: CPT

## 2023-12-14 NOTE — PATIENT INSTRUCTIONS
1. Continue Xarelto 20 mg daily  2. Continue Crestor 10 mg daily  3. Discontinue aspirin   4. Continue Hytrin 10 mg daily  5. Continue regular exercise-[employer]  6.  Heart healthy diet    Follow up in 6 months

## 2024-04-29 ENCOUNTER — TELEPHONE (OUTPATIENT)
Dept: CARDIOLOGY CLINIC | Age: 84
End: 2024-04-29

## 2024-04-29 NOTE — TELEPHONE ENCOUNTER
Pt has an upcoming ov on 7/31/2024 and would like to know if we need to fill out a continuation of care form prior to his appt. Per his appt desk it states that he had VA approval from    No

## 2024-04-29 NOTE — TELEPHONE ENCOUNTER
Attempted to contact pt regarding continuation of care form. If this form does need filled out we will need this faxed to our office.

## 2024-04-29 NOTE — TELEPHONE ENCOUNTER
Pt has an upcoming ov on 7/31/2024 and would like to know if we need to fill out a continuation of care form prior to his appt. Per his appt desk it states that he had VA approval from 7/12/23-01/08/23. Please advise.

## 2024-06-11 NOTE — TELEPHONE ENCOUNTER
Health Maintenance Summary     Topic Due On Due Status Completed On    MAMMOGRAM - BREAST CANCER SCREENING Oct 23, 2016 Overdue Oct 23, 2014    Colorectal Cancer Screening - Colonoscopy Jan 28, 2020 Not Due Jan 28, 2010    Osteoporosis Screening  Completed Apr 8, 2015    Immunization-Zoster  Completed Sep 15, 2014    Immunization - Pneumococcal Feb 12, 2019 Not Due Jan 28, 2016    Immunization - TDAP Pregnancy  Hidden     Medicare Wellness Visit Jan 28, 2017 Overdue Jan 28, 2016    IMMUNIZATION - DTaP/Tdap/Td Jan 14, 2023 Not Due Jan 14, 2013    Nissa Last  Completed Nov 14, 2017 Jan 28, 2017 Overdue Jan 28, 2016          Patient is up to date, no discussion needed . Per VA no forms need filled out

## 2024-07-26 ENCOUNTER — TELEPHONE (OUTPATIENT)
Dept: CARDIOLOGY CLINIC | Age: 84
End: 2024-07-26

## 2024-07-26 NOTE — TELEPHONE ENCOUNTER
Pt called and stated that the VA will now being taking care of his Cardio needs.  Mailing form for pt to complete and send back so records can be sent to VA.

## 2024-07-26 NOTE — TELEPHONE ENCOUNTER
Spoke with VA. They are faxing form over to be filled out. Please have form filled out and [phone]

## 2024-07-26 NOTE — TELEPHONE ENCOUNTER
Pt states he was told by the VA that auth for him to see [facility] has  and a new auth is needed. Pt states we have to call the VA at [phone] opt 4. Pt has appt on 24 and he does not want to come if we do not have this auth. Pt would like a call when this is done. Please advise.

## 2024-08-01 NOTE — TELEPHONE ENCOUNTER
Pt stated that he received ppw from us but it was for financial assistance. Pt would like to mailed ppw for a release of his medical records for the VA since they will be taking care of his cardiac care. Address on file is correct. Please advise.